# Patient Record
(demographics unavailable — no encounter records)

---

## 2025-02-26 NOTE — HISTORY OF PRESENT ILLNESS
[FreeTextEntry1] : Cardiologist- Dr Jaylon Rincon Mother of Ap Alaniz   EKG: NSR, PVC, left anterior hemiblock, no ST-Tw abn.

## 2025-02-26 NOTE — REASON FOR VISIT
[FreeTextEntry1] : 96 y/o F with a h/o Prinzmetal's angina who is getting CP at 6 AM while in bed.  She takes Imdur and Norvasc at 10:30 PM. Onset: 5-6 weeks ago, 2 different pain syndromes. an epigastric discomfort responsive to NTG and a sharp pain along sternum, much more recent. Location: Epigastric Duration:  10-15 min, sometimes requires a 2nd SL NTG Frequency: 3-4 days weekly Character:  sharp Associated Symptoms: SOB, rarely clammy Severity: moderate Modifying Factors:  6AM  2/26/25 Last here almost 3 yrs ago.  Trying to explain something that happened to her but unable.  Difficulty with word finding.  Takes Lorazepam from MD at [facility].  Has Albuterol for cough, + Advair.?  Discussed with her son and this is not new, she has a gradual decline in cerebral function. She believes dust have aggravated her.  She does need 4 teeth extracted

## 2025-02-26 NOTE — PHYSICAL EXAM

## 2025-02-26 NOTE — ASSESSMENT
[FreeTextEntry1] : Prinzmetal's- Move Imdur to Midnight.  RTO 6 mos. Came back 3 yrs later, no CP  CP- 2 different CP syn, will treat for GERD/PUD and move imdur later.  Trop sent and NL in 2022.  Epigastric discomfort-  h/o ulcers, change ASA to Plavix and start Protonix.  Remains on these meds

## 2025-02-26 NOTE — PHYSICAL EXAM

## 2025-02-26 NOTE — REASON FOR VISIT
[FreeTextEntry1] : 94 y/o F with a h/o Prinzmetal's angina who is getting CP at 6 AM while in bed.  She takes Imdur and Norvasc at 10:30 PM. Onset: 5-6 weeks ago, 2 different pain syndromes. an epigastric discomfort responsive to NTG and a sharp pain along sternum, much more recent. Location: Epigastric Duration:  10-15 min, sometimes requires a 2nd SL NTG Frequency: 3-4 days weekly Character:  sharp Associated Symptoms: SOB, rarely clammy Severity: moderate Modifying Factors:  6AM  2/26/25 Last here almost 3 yrs ago.  Trying to explain something that happened to her but unable.  Difficulty with word finding.  Takes Lorazepam from MD at [facility].  Has Albuterol for cough, + Advair.?  Discussed with her son and this is not new, she has a gradual decline in cerebral function. She believes dust have aggravated her.  She does need 4 teeth extracted